# Patient Record
(demographics unavailable — no encounter records)

---

## 2024-11-12 NOTE — HISTORY OF PRESENT ILLNESS
[de-identified] : Patient presents today for c/o dizziness / tinnitus.  She states she woke up with ringing in the right ear and then went into a 30-minute dizziness spell. She gets a room spinning sensation whenever she bends down or gets up too quickly. The sensation of the room spinning only lasts for a few seconds.  Symptoms have been ongoing for 3 weeks. She had a CT scan done in the ER because of this - negative for cva.  No further complaints.

## 2024-11-12 NOTE — ASSESSMENT
[FreeTextEntry1] : vertigo, appears positional.   normal today.  Order vng.  Follow up immediately after completing above.     Total time spent on patient encounter including review of patient's medical history, physical examination, interpretation of any indicated labs / imaging and counseling, excluding time spent performing any indicated procedures: 30-44 minutes.    Vern Kilpatrick MD, MPH Director of Pediatric Otolaryngology [facility] / [facility]